# Patient Record
(demographics unavailable — no encounter records)

---

## 2024-12-05 NOTE — PHYSICAL EXAM
[Chaperone Present] : A chaperone was present in the examining room during all aspects of the physical examination [07123] : A chaperone was present during the pelvic exam. [FreeTextEntry2] : Nancy Pulliam [Appropriately responsive] : appropriately responsive [Alert] : alert [No Acute Distress] : no acute distress [No Lymphadenopathy] : no lymphadenopathy [Soft] : soft [Non-tender] : non-tender [Non-distended] : non-distended [No HSM] : No HSM [No Lesions] : no lesions [No Mass] : no mass [Oriented x3] : oriented x3 [FreeTextEntry6] :  no masses, nipple discharge or  axillary adenopathy [Vulvar Atrophy] : vulvar atrophy [Labia Majora] : normal [Labia Minora] : normal [Atrophy] : atrophy [Normal] : normal [Uterine Adnexae] : normal [FreeTextEntry9] :  Rectal exam is normal and guaiac is negative.  External hemorrhoids

## 2024-12-05 NOTE — HISTORY OF PRESENT ILLNESS
[FreeTextEntry1] : Patient is here for annual examination There are no GYN complaints She lost a significant amount of weight on Mounjaro  She is status post breast biopsy

## 2024-12-05 NOTE — PLAN
[FreeTextEntry1] : Normal postmenopausal exam A Pap smear was taken Dietary calcium and weightbearing exercise and vitamin D for osteopenia Mammogram in July 2025 and follow-up 1 year